# Patient Record
Sex: FEMALE | Race: WHITE
[De-identification: names, ages, dates, MRNs, and addresses within clinical notes are randomized per-mention and may not be internally consistent; named-entity substitution may affect disease eponyms.]

---

## 2022-06-21 ENCOUNTER — HOSPITAL ENCOUNTER (EMERGENCY)
Dept: HOSPITAL 26 - MED | Age: 32
Discharge: HOME | End: 2022-06-21
Payer: SELF-PAY

## 2022-06-21 VITALS — DIASTOLIC BLOOD PRESSURE: 94 MMHG | SYSTOLIC BLOOD PRESSURE: 160 MMHG

## 2022-06-21 VITALS — BODY MASS INDEX: 36.07 KG/M2 | WEIGHT: 216.5 LBS | HEIGHT: 65 IN

## 2022-06-21 VITALS — SYSTOLIC BLOOD PRESSURE: 155 MMHG | DIASTOLIC BLOOD PRESSURE: 90 MMHG

## 2022-06-21 DIAGNOSIS — G58.9: Primary | ICD-10-CM

## 2022-06-21 DIAGNOSIS — R20.0: ICD-10-CM

## 2022-06-21 NOTE — NUR
no nursing care rendered-Patient discharged with v/s stable. Written and verbal 
after care instructions given and explained. 

Patient alert, oriented and verbalized understanding of instructions. 
Ambulatory with steady gait. All questions addressed prior to discharge. ID 
band removed. Patient advised to follow up with PMD. Rx of artificial 
drops,deltasone given. Patient educated on indication of medication including 
possible reaction and side effects. Opportunity to ask questions provided and 
answered.